# Patient Record
Sex: MALE | Race: WHITE | NOT HISPANIC OR LATINO | URBAN - METROPOLITAN AREA
[De-identification: names, ages, dates, MRNs, and addresses within clinical notes are randomized per-mention and may not be internally consistent; named-entity substitution may affect disease eponyms.]

---

## 2022-06-22 ENCOUNTER — APPOINTMENT (OUTPATIENT)
Dept: URBAN - METROPOLITAN AREA SURGERY 18 | Age: 87
Setting detail: DERMATOLOGY
End: 2022-06-25

## 2022-06-22 VITALS — SYSTOLIC BLOOD PRESSURE: 128 MMHG | HEART RATE: 78 BPM | DIASTOLIC BLOOD PRESSURE: 74 MMHG | RESPIRATION RATE: 20 BRPM

## 2022-06-22 PROBLEM — C44.329 SQUAMOUS CELL CARCINOMA OF SKIN OF OTHER PARTS OF FACE: Status: ACTIVE | Noted: 2022-06-22

## 2022-06-22 PROCEDURE — 17311 MOHS 1 STAGE H/N/HF/G: CPT

## 2022-06-22 PROCEDURE — 13132 CMPLX RPR F/C/C/M/N/AX/G/H/F: CPT

## 2022-06-22 PROCEDURE — OTHER COUNSELING: OTHER

## 2022-06-22 PROCEDURE — OTHER PATIENT SPECIFIC COUNSELING: OTHER

## 2022-06-22 PROCEDURE — 17312 MOHS ADDL STAGE: CPT

## 2022-06-22 PROCEDURE — OTHER MOHS SURGERY: OTHER

## 2022-06-22 PROCEDURE — OTHER REFERRAL CORRESPONDENCE: OTHER

## 2022-06-22 NOTE — PROCEDURE: PATIENT SPECIFIC COUNSELING
* The increased risks of tumor progression (located within old scar) reviewed in detail. The increased risks of surgery in area of prior large extensive surgery reviewed in detail given potential changes/shifts in anatomy that may occur with healing/scar formation. The patient understands healing in this area may be less predictable, prolonged or require referral to additional specialists given the prior surgery/scar tissue present
Detail Level: Zone

## 2022-06-22 NOTE — PROCEDURE: MOHS SURGERY
no Helical Rim Advancement Flap Text: Given the location of the defect, inherent tension at the surgical site, and the proximity to free margins a helical rim advancement flap was deemed most appropriate for wound reconstruction. The risks, benefits, and possible outcomes of this procedure were discussed along with the risks, benefits, and possible outcomes of other options for wound repair (including but not limited to: complex closure, other flaps, grafts, and second intention). The patient verbalized understanding and consent to the outlined procedure. The patient verbalized understanding that intraoperative conditions may necessitate a change in the outlined procedure resulting in modification of the original surgical plan. This decision may be made at the discretion of the surgeon, and is due to factors subject to change or that are difficult to predict preoperatively. Using a sterile surgical marker, an appropriate helical rim advancement flap was drawn incorporating the defect and placing the expected incisions within the relaxed skin tension lines where possible. The surgical site and surrounding skin were prepped and draped in sterile fashion. Anesthesia was checked and supplemented as necessary. A single standing cone was then excised posterior to the defect. A linear arc-like incision was removed at the anterior aspect of the defect and carried to a point of relative auricular skin laxity. A second dog ear was then removed at the end of this incision. The flap was undermined widely and bilaterally in the appropriate surgical plane. Hemostasis was obtained and then the flap was sutured together in layered fashion.

## 2022-06-22 NOTE — HPI: PROCEDURE (MOHS)
Has The Growth Been Previously Biopsied?: has been previously biopsied
Additional History: The patient previously had a large surgery on his left face for an unknown type of skin cancer (the patient believes it may have been a melanoma) followed by reconstruction with a free flap at [facility] approximately 8-10 years ago. The patient does not believe the previous skin cancer was a squamous cell carcinoma
Year Removed: 1900

## 2022-08-31 ENCOUNTER — APPOINTMENT (OUTPATIENT)
Dept: URBAN - METROPOLITAN AREA SURGERY 18 | Age: 87
Setting detail: DERMATOLOGY
End: 2022-09-02

## 2022-08-31 DIAGNOSIS — D485 NEOPLASM OF UNCERTAIN BEHAVIOR OF SKIN: ICD-10-CM

## 2022-08-31 DIAGNOSIS — Z71.89 OTHER SPECIFIED COUNSELING: ICD-10-CM

## 2022-08-31 PROBLEM — D48.5 NEOPLASM OF UNCERTAIN BEHAVIOR OF SKIN: Status: ACTIVE | Noted: 2022-08-31

## 2022-08-31 PROCEDURE — OTHER COUNSELING: OTHER

## 2022-08-31 PROCEDURE — OTHER DIAGNOSIS COMMENT: OTHER

## 2022-08-31 PROCEDURE — 99213 OFFICE O/P EST LOW 20 MIN: CPT

## 2022-08-31 ASSESSMENT — LOCATION DETAILED DESCRIPTION DERM
LOCATION DETAILED: SUPERIOR MID FOREHEAD
LOCATION DETAILED: LEFT SUPERIOR PARIETAL SCALP
LOCATION DETAILED: RIGHT SUPERIOR PARIETAL SCALP

## 2022-08-31 ASSESSMENT — LOCATION ZONE DERM
LOCATION ZONE: FACE
LOCATION ZONE: SCALP

## 2022-08-31 ASSESSMENT — LOCATION SIMPLE DESCRIPTION DERM
LOCATION SIMPLE: SCALP
LOCATION SIMPLE: SUPERIOR FOREHEAD

## 2022-08-31 NOTE — PROCEDURE: DIAGNOSIS COMMENT
Comment: Numerous lesions on the face and scalp that are suspicous for NMSCs.  I recommended biopsy but pt declined at this time, he would like to have the large skin cancer on his left parietal scalp treated first.  Risks/benefits of biopsy reviewed.  Potential adverse outcomes associated with declining a biopsy reviewed, including but not limited to that he could be leaving a skin cancer in place that if left untreated could result in serious medical problems and ultimately death. He verbalized understanding.  He declines a f/u apt at this time for additional biopsies.

## 2022-08-31 NOTE — PROCEDURE: DIAGNOSIS COMMENT
Comment: Differential diagnosis includes but is not limited to BCC vs SCC. Given the size of the lesion and that bone is visible will refer to Dr. Andrzej Galicia for biopsy, however treatment will need to be done by Dr. Hook or oral and maxillofacial surgeon, Dr. Nikoaly MD.  Will attempt to get records from Dr. Hernandez, whom pt states treated the lesion \"10 years ago\".  Will call pt with follow up regarding which surgeon he will be referred to for treatment.  He verbalized understanding. Comment: Differential diagnosis includes but is not limited to BCC vs SCC. Given the size of the lesion and that bone is visible will refer to Dr. Andrzej Galicia for biopsy, however treatment will need to be done by Dr. Hook or oral and maxillofacial surgeon, Dr. Nikolay MD.  Will attempt to get records from Dr. Hernanedz, whom pt states treated the lesion \"10 years ago\".  Will call pt with follow up regarding which surgeon he will be referred to for treatment.  He verbalized understanding.

## 2022-09-15 ENCOUNTER — APPOINTMENT (OUTPATIENT)
Dept: URBAN - METROPOLITAN AREA SURGERY 18 | Age: 87
Setting detail: DERMATOLOGY
End: 2022-09-16

## 2022-09-15 PROBLEM — D48.5 NEOPLASM OF UNCERTAIN BEHAVIOR OF SKIN: Status: ACTIVE | Noted: 2022-09-15

## 2022-09-15 PROCEDURE — 11102 TANGNTL BX SKIN SINGLE LES: CPT

## 2022-09-15 PROCEDURE — OTHER BIOPSY BY SHAVE METHOD: OTHER

## 2022-09-15 NOTE — HPI: PROCEDURE (SKIN BIOPSY)
How Severe Is Your Condition?: severe
Additional History: The patient previously had surgery with Dr. Hernandez on the scalp (patient unsure of how many years ago or the skin cancer that was treated). The present lesion is suspicious for recurrence of skin cancer.
Year Removed: 1900

## 2022-09-15 NOTE — PROCEDURE: BIOPSY BY SHAVE METHOD
Post-Care Instructions: I reviewed with the patient in detail post-care instructions. Patient is to keep the biopsy site bandaged overnight and then begin daily wound care (washing with soap/water then applying new bandage of vaseline, telfa and tape) until healed.